# Patient Record
(demographics unavailable — no encounter records)

---

## 2024-10-08 NOTE — PROCEDURE
[[email or web address]] : Preopdx: scalp   mass Procedure: excisional biopsy  3.1 cm, and complex closure 3.1 cm mass scalp Anesthesia: local 1% w/epi Specimens: to path on formalin No complications   Summary: IC obtained.  Lesion demarcated with marking pen.  1%lido with epinephrine injected.  15 blade used to incise full thickness.    3.1Cm  lesion excised in subcutaneous plane.   Hemostasis obtained with cautery.  Skin edges widely undermined and closed for a complex closure of  3.1 cm.  bacitracin and steristrips placed.